# Patient Record
Sex: MALE | URBAN - METROPOLITAN AREA
[De-identification: names, ages, dates, MRNs, and addresses within clinical notes are randomized per-mention and may not be internally consistent; named-entity substitution may affect disease eponyms.]

---

## 2024-02-12 ENCOUNTER — APPOINTMENT (RX ONLY)
Dept: URBAN - METROPOLITAN AREA CLINIC 162 | Facility: CLINIC | Age: 34
Setting detail: DERMATOLOGY
End: 2024-02-12

## 2024-02-12 DIAGNOSIS — B02.29 OTHER POSTHERPETIC NERVOUS SYSTEM INVOLVEMENT: ICD-10-CM

## 2024-02-12 PROCEDURE — ? COUNSELING

## 2024-02-12 PROCEDURE — 99203 OFFICE O/P NEW LOW 30 MIN: CPT

## 2024-02-12 PROCEDURE — ? ADDITIONAL NOTES

## 2024-02-12 PROCEDURE — ? OTC TREATMENT REGIMEN

## 2024-02-12 ASSESSMENT — LOCATION SIMPLE DESCRIPTION DERM: LOCATION SIMPLE: DORSAL PENIS

## 2024-02-12 ASSESSMENT — LOCATION DETAILED DESCRIPTION DERM: LOCATION DETAILED: BASE OF DORSAL PENILE SHAFT

## 2024-02-12 ASSESSMENT — LOCATION ZONE DERM: LOCATION ZONE: PENIS

## 2024-02-12 NOTE — PROCEDURE: ADDITIONAL NOTES
Additional Notes: The patient was informed that the post herpetic neuralgia should eventually cure on its own and that the lesion only bothers after sexual activity. supplied the patient with Dermeleve samples and instructed him to inform his PCP that he had post-herpetic neuralgia.
Detail Level: Simple
Render Risk Assessment In Note?: no